# Patient Record
(demographics unavailable — no encounter records)

---

## 2025-01-10 NOTE — PLAN
[FreeTextEntry5] : Assessment: Patient is a 48 yo female with h/o depression, anxiety, ADD seen today for medication management. Patient is compliant with the medications, tolerating it well without any side effects. I-STOP was checked without any problems.  PLAN: Continue Lexapro 20 mg PO QD for depression and anxiety Continue Wellbutrin  mg PO QD for depression, low motivation, energy, concentration and decrease SSRI induced sexual dysfunction. Continue Adderall ER 20 and IR 10 PO QD PRN for ADD Continue Xanax 0.25 mg PRN for anxiety #10  - Discussed risks and benefits of medications including side effects of GI and sexual with SSRI. Alternative strategies including no intervention discussed with patient. Patient consents to current medications as prescribed. - Discussed with patient regarding importance of abstinence and sobriety from alcohol and drugs. Educated about relationship between worsening mood/anxiety symptoms and drug use and improvement of symptoms with abstinence.  - Discussed about unpredictable effects including cardiorespiratory collapse from the combination of illicit drugs and prescribed medications. Patient verbalized understanding. - Patient understands to contact clinic prn with concerns and agrees to call 911 or go to nearest ER if symptoms worsen. - Next appointment made in 3 months. Patient was not in any distress.  I spent a total of 45 minutes for today's visit in evaluating and treating the patient as per above, including: preparing for patient's appointment (review of prior documents, [facility] ), performing a medically necessary exam/evaluation, communicating/counseling/educating the patient ordering medications

## 2025-02-20 NOTE — PHYSICAL EXAM
[Chaperone Present] : A chaperone was present in the examining room during all aspects of the physical examination [FreeTextEntry2] : CHARLENE Paz [Appropriately responsive] : appropriately responsive [Alert] : alert [No Acute Distress] : no acute distress [Thyroid Nodule] : thyroid nodule [Non-tender] : non-tender [Non-distended] : non-distended [No HSM] : No HSM [No Lesions] : no lesions [No Mass] : no mass [Oriented x3] : oriented x3 [Examination Of The Breasts] : a normal appearance [No Discharge] : no discharge [No Masses] : no breast masses were palpable [Labia Majora] : normal [Labia Minora] : normal [Discharge] : a  ~M vaginal discharge was present [Moderate] : moderate [White] : white [No Bleeding] : There was no active vaginal bleeding [Soft] : soft [Normal] : normal [Normal Position] : in a normal position [Tenderness] : nontender [Enlarged ___ wks] : not enlarged [Mass ___ cm] : no uterine mass was palpated [Uterine Adnexae] : normal [FreeTextEntry9] : S/P colonoscopy  7/2024

## 2025-02-20 NOTE — HISTORY OF PRESENT ILLNESS
[Y] : Patient is sexually active [Monogamous (Male Partner)] : is monogamous with a male partner [TextBox_102] : Spotting X one week  Heavy X 4 days [LMPDate] : 2/1/25 [MensesLength] : 18 [facility] 2 [FreeTextEntry1] : C/S

## 2025-04-11 NOTE — PLAN
[FreeTextEntry5] : Assessment: Patient is a 48 yo female with h/o depression, anxiety, ADD seen today for medication management. Patient is compliant with the medications, tolerating it well without any side effects. I-STOP was checked without any problems.  PLAN: Continue Lexapro 20 mg PO QD for depression and anxiety Continue Wellbutrin  mg PO QD for depression, low motivation, energy, concentration and decrease SSRI induced sexual dysfunction. Continue Adderall ER 20 and IR 10 PO QD PRN for ADD Continue Xanax 0.25 mg PRN for anxiety #10  - Discussed risks and benefits of medications including side effects of GI and sexual with SSRI. Alternative strategies including no intervention discussed with patient. Patient consents to current medications as prescribed. - Discussed with patient regarding importance of abstinence and sobriety from alcohol and drugs. Educated about relationship between worsening mood/anxiety symptoms and drug use and improvement of symptoms with abstinence.  - Discussed about unpredictable effects including cardiorespiratory collapse from the combination of illicit drugs and prescribed medications. Patient verbalized understanding. - Patient understands to contact clinic prn with concerns and agrees to call 911 or go to nearest ER if symptoms worsen. - Next appointment made in 3 months. Patient was not in any distress.

## 2025-04-11 NOTE — HISTORY OF PRESENT ILLNESS
[FreeTextEntry1] : Patient is here for 3 month follow-up visit via telehealth.  No medication changes at that time. States her company at her work is closing down so she has to find a new job. Has a couple of interviews lined up. Little overwhelmed with that but states it is manageable.   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   Mood: stable. Less depression and anxiety. Manageable.  Sleepin hours  Appetite: is good. Weight 184 lbs and Ht 5'2 Energy: better Concentration: better. Using the Adderall IR 10 mg first and then when it wears off, she uses the Adderall ER 20 mg.   Motivation: better Denies any AVH, SI or HI. Caffeine: 2 cups of coffee per day.  No manic/hypomanic symptoms Denies any alcohol or substance use.  1-2 days Drug holiday.

## 2025-04-11 NOTE — REASON FOR VISIT
[Telehealth (audio & video) - Individual/Group] : This visit was provided via telehealth using real-time 2-way audio visual technology. [Patient preference] : Patient preference. [Medical Office: ([facility])___] : The provider was located at the medical office in [unfilled]. [Home] : The patient, [unfilled], was located at home, [unfilled], at the time of the visit. [Participant(s) identity verified] : Participant(s) identity verified. [Verbal consent obtained from patient/other participant(s)] : Verbal consent for telehealth/telephonic services obtained from patient/other participant(s)

## 2025-07-15 NOTE — PLAN
[FreeTextEntry5] : Assessment: Patient is a 48 yo female with h/o depression, anxiety, ADD seen today for medication management. Patient is compliant with the medications, tolerating it well without any side effects. I-STOP was checked without any problems.  PLAN: Continue Lexapro 20 mg PO QD for depression and anxiety Increase Wellbutrin  mg to 450 mg PO QD for depression, low motivation, energy, concentration and decrease SSRI induced sexual dysfunction. Continue Adderall ER 20 and IR 10 PO QD PRN for ADD Continue Xanax 0.25 mg PRN for anxiety #10  - Discussed risks and benefits of medications including side effects of GI and sexual with SSRI. Alternative strategies including no intervention discussed with patient. Patient consents to current medications as prescribed. - Discussed with patient regarding importance of abstinence and sobriety from alcohol and drugs. Educated about relationship between worsening mood/anxiety symptoms and drug use and improvement of symptoms with abstinence.  - Discussed about unpredictable effects including cardiorespiratory collapse from the combination of illicit drugs and prescribed medications. Patient verbalized understanding. - Patient understands to contact clinic prn with concerns and agrees to call 911 or go to nearest ER if symptoms worsen. - Next appointment made in 1 month. Patient was not in any distress.

## 2025-07-15 NOTE — REASON FOR VISIT
[Patient preference] : as per patient preference [Continuity of care] : to ensure continuity of care [Telehealth (audio & video) - Individual/Group] : This visit was provided via telehealth using real-time 2-way audio visual technology. [Medical Office: ([facility])___] : The provider was located at the medical office in [unfilled]. [Home] : The patient, [unfilled], was located at home, [unfilled], at the time of the visit. [Participant(s) identity verified] : Participant(s) identity verified. [Verbal consent obtained from patient/other participant(s)] : Verbal consent for telehealth/telephonic services obtained from patient/other participant(s)

## 2025-07-15 NOTE — HISTORY OF PRESENT ILLNESS
[FreeTextEntry1] : Patient is here for 3 month follow-up visit via telehealth.  No medication changes at that time. States her company at her work is closing down so she has to find a new job. She may be laid off. Givibg her severance pay which is OK but she has to find a new job.   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   Mood: "Blah." Seen to be tearful. Thinking of going to [place] with her boyfriend. She wants her mother to tell her to have a good time and take care of her 18 yo while she is gone. Has been feeling low energy and not motivated to do anything. Has taken 2-3 Xanax in last 6 weeks.  Sleepin hours  Appetite: is good. On Zepbound. Went from 194 to now 165 lbs. Ht 5'2 Energy: decreased Concentration: is OK.  Using the Adderall IR 10 mg first and then when it wears off, she uses the Adderall ER 20 mg.   Motivation: decreased Denies any AVH, SI or HI. Caffeine: 2 cups of coffee per day.  No manic/hypomanic symptoms Denies any alcohol or substance use.  1-2 days Drug holiday.